# Patient Record
Sex: MALE | Race: WHITE | Employment: OTHER | URBAN - METROPOLITAN AREA
[De-identification: names, ages, dates, MRNs, and addresses within clinical notes are randomized per-mention and may not be internally consistent; named-entity substitution may affect disease eponyms.]

---

## 2019-12-06 ENCOUNTER — HOSPITAL ENCOUNTER (EMERGENCY)
Age: 64
Discharge: HOME | End: 2019-12-06
Attending: EMERGENCY MEDICINE
Payer: MEDICARE

## 2019-12-06 ENCOUNTER — APPOINTMENT (OUTPATIENT)
Dept: CT IMAGING | Age: 64
End: 2019-12-06
Payer: MEDICARE

## 2019-12-06 VITALS
OXYGEN SATURATION: 95 % | HEART RATE: 92 BPM | SYSTOLIC BLOOD PRESSURE: 148 MMHG | DIASTOLIC BLOOD PRESSURE: 90 MMHG | WEIGHT: 180 LBS | TEMPERATURE: 97.9 F | HEIGHT: 67 IN | BODY MASS INDEX: 28.25 KG/M2 | RESPIRATION RATE: 16 BRPM

## 2019-12-06 DIAGNOSIS — E86.0 DEHYDRATION: ICD-10-CM

## 2019-12-06 DIAGNOSIS — M54.9 ACUTE RIGHT-SIDED BACK PAIN, UNSPECIFIED BACK LOCATION: Primary | ICD-10-CM

## 2019-12-06 LAB
ALBUMIN SERPL-MCNC: 4.7 G/DL (ref 3.5–5.2)
ALP BLD-CCNC: 103 U/L (ref 40–129)
ALT SERPL-CCNC: 72 U/L (ref 0–40)
ANION GAP SERPL CALCULATED.3IONS-SCNC: 14 MMOL/L (ref 7–16)
AST SERPL-CCNC: 40 U/L (ref 0–39)
BACTERIA: ABNORMAL /HPF
BASOPHILS ABSOLUTE: 0.04 E9/L (ref 0–0.2)
BASOPHILS RELATIVE PERCENT: 0.5 % (ref 0–2)
BILIRUB SERPL-MCNC: 0.5 MG/DL (ref 0–1.2)
BILIRUBIN URINE: NEGATIVE
BLOOD, URINE: NEGATIVE
BUN BLDV-MCNC: 21 MG/DL (ref 8–23)
CALCIUM SERPL-MCNC: 10 MG/DL (ref 8.6–10.2)
CHLORIDE BLD-SCNC: 101 MMOL/L (ref 98–107)
CLARITY: CLEAR
CO2: 26 MMOL/L (ref 22–29)
COLOR: YELLOW
CREAT SERPL-MCNC: 0.8 MG/DL (ref 0.7–1.2)
EOSINOPHILS ABSOLUTE: 0.19 E9/L (ref 0.05–0.5)
EOSINOPHILS RELATIVE PERCENT: 2.4 % (ref 0–6)
GFR AFRICAN AMERICAN: >60
GFR NON-AFRICAN AMERICAN: >60 ML/MIN/1.73
GLUCOSE BLD-MCNC: 131 MG/DL (ref 74–99)
GLUCOSE URINE: NEGATIVE MG/DL
HCT VFR BLD CALC: 51.5 % (ref 37–54)
HEMOGLOBIN: 18.1 G/DL (ref 12.5–16.5)
IMMATURE GRANULOCYTES #: 0.05 E9/L
IMMATURE GRANULOCYTES %: 0.6 % (ref 0–5)
KETONES, URINE: ABNORMAL MG/DL
LACTIC ACID: 2.3 MMOL/L (ref 0.5–2.2)
LEUKOCYTE ESTERASE, URINE: NEGATIVE
LIPASE: 31 U/L (ref 13–60)
LYMPHOCYTES ABSOLUTE: 2.04 E9/L (ref 1.5–4)
LYMPHOCYTES RELATIVE PERCENT: 26.1 % (ref 20–42)
MCH RBC QN AUTO: 31.2 PG (ref 26–35)
MCHC RBC AUTO-ENTMCNC: 35.1 % (ref 32–34.5)
MCV RBC AUTO: 88.6 FL (ref 80–99.9)
MONOCYTES ABSOLUTE: 0.77 E9/L (ref 0.1–0.95)
MONOCYTES RELATIVE PERCENT: 9.8 % (ref 2–12)
NEUTROPHILS ABSOLUTE: 4.73 E9/L (ref 1.8–7.3)
NEUTROPHILS RELATIVE PERCENT: 60.6 % (ref 43–80)
NITRITE, URINE: NEGATIVE
PDW BLD-RTO: 12.4 FL (ref 11.5–15)
PH UA: 5.5 (ref 5–9)
PLATELET # BLD: 253 E9/L (ref 130–450)
PMV BLD AUTO: 9 FL (ref 7–12)
POTASSIUM SERPL-SCNC: 4 MMOL/L (ref 3.5–5)
PROTEIN UA: NEGATIVE MG/DL
RBC # BLD: 5.81 E12/L (ref 3.8–5.8)
RBC UA: ABNORMAL /HPF (ref 0–2)
SODIUM BLD-SCNC: 141 MMOL/L (ref 132–146)
SPECIFIC GRAVITY UA: 1.02 (ref 1–1.03)
TOTAL PROTEIN: 7.9 G/DL (ref 6.4–8.3)
TROPONIN: <0.01 NG/ML (ref 0–0.03)
UROBILINOGEN, URINE: 0.2 E.U./DL
WBC # BLD: 7.8 E9/L (ref 4.5–11.5)
WBC UA: ABNORMAL /HPF (ref 0–5)

## 2019-12-06 PROCEDURE — 84484 ASSAY OF TROPONIN QUANT: CPT

## 2019-12-06 PROCEDURE — 83605 ASSAY OF LACTIC ACID: CPT

## 2019-12-06 PROCEDURE — 80053 COMPREHEN METABOLIC PANEL: CPT

## 2019-12-06 PROCEDURE — 85025 COMPLETE CBC W/AUTO DIFF WBC: CPT

## 2019-12-06 PROCEDURE — 99283 EMERGENCY DEPT VISIT LOW MDM: CPT

## 2019-12-06 PROCEDURE — 36415 COLL VENOUS BLD VENIPUNCTURE: CPT

## 2019-12-06 PROCEDURE — 81001 URINALYSIS AUTO W/SCOPE: CPT

## 2019-12-06 PROCEDURE — 83690 ASSAY OF LIPASE: CPT

## 2019-12-06 ASSESSMENT — PAIN DESCRIPTION - LOCATION: LOCATION: FLANK

## 2019-12-06 ASSESSMENT — PAIN DESCRIPTION - ORIENTATION: ORIENTATION: RIGHT

## 2019-12-06 ASSESSMENT — PAIN DESCRIPTION - DESCRIPTORS: DESCRIPTORS: DISCOMFORT

## 2019-12-06 ASSESSMENT — PAIN DESCRIPTION - PAIN TYPE: TYPE: ACUTE PAIN

## 2019-12-06 ASSESSMENT — PAIN SCALES - GENERAL: PAINLEVEL_OUTOF10: 3

## 2020-07-13 ENCOUNTER — APPOINTMENT (OUTPATIENT)
Dept: GENERAL RADIOLOGY | Age: 65
End: 2020-07-13
Payer: MEDICARE

## 2020-07-13 ENCOUNTER — HOSPITAL ENCOUNTER (EMERGENCY)
Age: 65
Discharge: HOME | End: 2020-07-13
Payer: MEDICARE

## 2020-07-13 VITALS
WEIGHT: 185 LBS | HEART RATE: 78 BPM | SYSTOLIC BLOOD PRESSURE: 142 MMHG | OXYGEN SATURATION: 96 % | RESPIRATION RATE: 16 BRPM | HEIGHT: 66 IN | TEMPERATURE: 98.1 F | BODY MASS INDEX: 29.73 KG/M2 | DIASTOLIC BLOOD PRESSURE: 78 MMHG

## 2020-07-13 LAB
ALBUMIN SERPL-MCNC: 4.8 G/DL (ref 3.5–5.2)
ALP BLD-CCNC: 88 U/L (ref 40–129)
ALT SERPL-CCNC: 58 U/L (ref 0–40)
ANION GAP SERPL CALCULATED.3IONS-SCNC: 14 MMOL/L (ref 7–16)
AST SERPL-CCNC: 39 U/L (ref 0–39)
BASOPHILS ABSOLUTE: 0.03 E9/L (ref 0–0.2)
BASOPHILS RELATIVE PERCENT: 0.3 % (ref 0–2)
BILIRUB SERPL-MCNC: 0.4 MG/DL (ref 0–1.2)
BILIRUBIN URINE: NEGATIVE
BLOOD, URINE: NEGATIVE
BUN BLDV-MCNC: 20 MG/DL (ref 8–23)
CALCIUM SERPL-MCNC: 9.9 MG/DL (ref 8.6–10.2)
CHLORIDE BLD-SCNC: 103 MMOL/L (ref 98–107)
CLARITY: CLEAR
CO2: 21 MMOL/L (ref 22–29)
COLOR: YELLOW
CREAT SERPL-MCNC: 1.1 MG/DL (ref 0.7–1.2)
EOSINOPHILS ABSOLUTE: 0.01 E9/L (ref 0.05–0.5)
EOSINOPHILS RELATIVE PERCENT: 0.1 % (ref 0–6)
GFR AFRICAN AMERICAN: >60
GFR NON-AFRICAN AMERICAN: >60 ML/MIN/1.73
GLUCOSE BLD-MCNC: 126 MG/DL (ref 74–99)
GLUCOSE URINE: NEGATIVE MG/DL
HCT VFR BLD CALC: 49.4 % (ref 37–54)
HEMOGLOBIN: 17.8 G/DL (ref 12.5–16.5)
IMMATURE GRANULOCYTES #: 0.11 E9/L
IMMATURE GRANULOCYTES %: 0.9 % (ref 0–5)
KETONES, URINE: NEGATIVE MG/DL
LEUKOCYTE ESTERASE, URINE: NEGATIVE
LYMPHOCYTES ABSOLUTE: 1.44 E9/L (ref 1.5–4)
LYMPHOCYTES RELATIVE PERCENT: 12.4 % (ref 20–42)
MCH RBC QN AUTO: 31.8 PG (ref 26–35)
MCHC RBC AUTO-ENTMCNC: 36 % (ref 32–34.5)
MCV RBC AUTO: 88.2 FL (ref 80–99.9)
MONOCYTES ABSOLUTE: 0.64 E9/L (ref 0.1–0.95)
MONOCYTES RELATIVE PERCENT: 5.5 % (ref 2–12)
NEUTROPHILS ABSOLUTE: 9.41 E9/L (ref 1.8–7.3)
NEUTROPHILS RELATIVE PERCENT: 80.8 % (ref 43–80)
NITRITE, URINE: NEGATIVE
PDW BLD-RTO: 12.1 FL (ref 11.5–15)
PH UA: 5.5 (ref 5–9)
PLATELET # BLD: 265 E9/L (ref 130–450)
PMV BLD AUTO: 9.1 FL (ref 7–12)
POTASSIUM REFLEX MAGNESIUM: 4.5 MMOL/L (ref 3.5–5)
PROTEIN UA: NEGATIVE MG/DL
RBC # BLD: 5.6 E12/L (ref 3.8–5.8)
SODIUM BLD-SCNC: 138 MMOL/L (ref 132–146)
SPECIFIC GRAVITY UA: 1.01 (ref 1–1.03)
TOTAL PROTEIN: 7.4 G/DL (ref 6.4–8.3)
TROPONIN: <0.01 NG/ML (ref 0–0.03)
UROBILINOGEN, URINE: 0.2 E.U./DL
WBC # BLD: 11.6 E9/L (ref 4.5–11.5)

## 2020-07-13 PROCEDURE — 99283 EMERGENCY DEPT VISIT LOW MDM: CPT

## 2020-07-13 PROCEDURE — 80053 COMPREHEN METABOLIC PANEL: CPT

## 2020-07-13 PROCEDURE — 84484 ASSAY OF TROPONIN QUANT: CPT

## 2020-07-13 PROCEDURE — 36415 COLL VENOUS BLD VENIPUNCTURE: CPT

## 2020-07-13 PROCEDURE — 71045 X-RAY EXAM CHEST 1 VIEW: CPT

## 2020-07-13 PROCEDURE — 81003 URINALYSIS AUTO W/O SCOPE: CPT

## 2020-07-13 PROCEDURE — 93005 ELECTROCARDIOGRAM TRACING: CPT | Performed by: NURSE PRACTITIONER

## 2020-07-13 PROCEDURE — 2580000003 HC RX 258: Performed by: NURSE PRACTITIONER

## 2020-07-13 PROCEDURE — 85025 COMPLETE CBC W/AUTO DIFF WBC: CPT

## 2020-07-13 PROCEDURE — 93005 ELECTROCARDIOGRAM TRACING: CPT | Performed by: EMERGENCY MEDICINE

## 2020-07-13 RX ORDER — 0.9 % SODIUM CHLORIDE 0.9 %
1000 INTRAVENOUS SOLUTION INTRAVENOUS ONCE
Status: COMPLETED | OUTPATIENT
Start: 2020-07-13 | End: 2020-07-13

## 2020-07-13 RX ORDER — PREDNISONE 10 MG/1
10 TABLET ORAL DAILY
COMMUNITY
End: 2021-05-17

## 2020-07-13 RX ADMIN — SODIUM CHLORIDE 1000 ML: 9 INJECTION, SOLUTION INTRAVENOUS at 17:02

## 2020-07-13 NOTE — ED NOTES
Pt reports he is having some dizziness while lying in the cart. He denies chest pain at this time but has nausea.      [facility], [address] [place] [address]  07/13/20 8259

## 2020-07-13 NOTE — ED NOTES
Sadia BLANKENSHIP reports pt c/o dizziness. EKG in progress.       [facility], [address] [place] [address]  07/13/20 4429

## 2020-07-13 NOTE — ED PROVIDER NOTES
Saturation Interpretation: Normal.   ED Triage Vitals [07/13/20 1535]   BP Temp Temp Source Pulse Resp SpO2 Height Weight   (!) 158/106 98.1 °F (36.7 °C) Oral 102 16 96 % 5' 6\" (1.676 m) 185 lb (83.9 kg)       Physical Exam  · Constitutional/General: Alert and oriented x3, well appearing, non toxic. · HEENT:  NC/NT. PERRLA,  Airway patent. · Neck: Supple, full ROM, non tender to palpation in the midline, no stridor, no crepitus, no meningeal signs  · Respiratory: Lungs clear to auscultation bilaterally, no wheezes, rales, or rhonchi. Not in respiratory distress  · CV:  Regular rate. Regular rhythm. No murmurs, gallops, or rubs. 2+ distal pulses  · Chest: No chest wall tenderness  · GI:  Abdomen Soft, Non tender, Non distended. +BS. No rebound, guarding, or rigidity. No pulsatile masses. · Musculoskeletal: Moves all extremities x 4. Warm and well perfused, no clubbing, cyanosis, or edema. Capillary refill <3 seconds  · Integument: skin warm and dry. Diffuse macular, non-erythematous rash noted to back. No erythema. No drainage.    · Lymphatic: no lymphadenopathy noted  · Neurologic: GCS 15, no focal deficits, symmetric strength 5/5 in the upper and lower extremities bilaterally  · Psychiatric: Normal Affect      Lab / Imaging Results   (All laboratory and radiology results have been personally reviewed by myself)  Labs:  Results for orders placed or performed during the hospital encounter of 07/13/20   URINALYSIS   Result Value Ref Range    Color, UA Yellow Straw/Yellow    Clarity, UA Clear Clear    Glucose, Ur Negative Negative mg/dL    Bilirubin Urine Negative Negative    Ketones, Urine Negative Negative mg/dL    Specific Gravity, UA 1.015 1.005 - 1.030    Blood, Urine Negative Negative    pH, UA 5.5 5.0 - 9.0    Protein, UA Negative Negative mg/dL    Urobilinogen, Urine 0.2 <2.0 E.U./dL    Nitrite, Urine Negative Negative    Leukocyte Esterase, Urine Negative Negative   CBC Auto Differential   Result Value Ref Range    WBC 11.6 (H) 4.5 - 11.5 E9/L    RBC 5.60 3.80 - 5.80 E12/L    Hemoglobin 17.8 (H) 12.5 - 16.5 g/dL    Hematocrit 49.4 37.0 - 54.0 %    MCV 88.2 80.0 - 99.9 fL    MCH 31.8 26.0 - 35.0 pg    MCHC 36.0 (H) 32.0 - 34.5 %    RDW 12.1 11.5 - 15.0 fL    Platelets 744 291 - 062 E9/L    MPV 9.1 7.0 - 12.0 fL    Neutrophils % 80.8 (H) 43.0 - 80.0 %    Immature Granulocytes % 0.9 0.0 - 5.0 %    Lymphocytes % 12.4 (L) 20.0 - 42.0 %    Monocytes % 5.5 2.0 - 12.0 %    Eosinophils % 0.1 0.0 - 6.0 %    Basophils % 0.3 0.0 - 2.0 %    Neutrophils Absolute 9.41 (H) 1.80 - 7.30 E9/L    Immature Granulocytes # 0.11 E9/L    Lymphocytes Absolute 1.44 (L) 1.50 - 4.00 E9/L    Monocytes Absolute 0.64 0.10 - 0.95 E9/L    Eosinophils Absolute 0.01 (L) 0.05 - 0.50 E9/L    Basophils Absolute 0.03 0.00 - 0.20 E9/L   Comprehensive Metabolic Panel w/ Reflex to MG   Result Value Ref Range    Sodium 138 132 - 146 mmol/L    Potassium reflex Magnesium 4.5 3.5 - 5.0 mmol/L    Chloride 103 98 - 107 mmol/L    CO2 21 (L) 22 - 29 mmol/L    Anion Gap 14 7 - 16 mmol/L    Glucose 126 (H) 74 - 99 mg/dL    BUN 20 8 - 23 mg/dL    CREATININE 1.1 0.7 - 1.2 mg/dL    GFR Non-African American >60 >=60 mL/min/1.73    GFR African American >60     Calcium 9.9 8.6 - 10.2 mg/dL    Total Protein 7.4 6.4 - 8.3 g/dL    Alb 4.8 3.5 - 5.2 g/dL    Total Bilirubin 0.4 0.0 - 1.2 mg/dL    Alkaline Phosphatase 88 40 - 129 U/L    ALT 58 (H) 0 - 40 U/L    AST 39 0 - 39 U/L   Troponin   Result Value Ref Range    Troponin <0.01 0.00 - 0.03 ng/mL   EKG 12 Lead   Result Value Ref Range    Ventricular Rate 91 BPM    Atrial Rate 91 BPM    P-R Interval 150 ms    QRS Duration 100 ms    Q-T Interval 370 ms    QTc Calculation (Bazett) 455 ms    P Axis 34 degrees    R Axis -45 degrees    T Axis 29 degrees   EKG 12 Lead   Result Value Ref Range    Ventricular Rate 94 BPM    Atrial Rate 66 BPM    P-R Interval 138 ms    QRS Duration 102 ms    Q-T Interval 366 ms    QTc Calculation with Prednisone and MSM. His symptoms continue to improve quickly throughout his stay. CBC and CMP unremarkable. Troponin < 0.01. EKG sinus rhythm no acute finding and stable when compared to previous. No chest pain or shortness of breath. Chest x-ray negative. He was given IV fluids with significant improvement of his symptoms. He requested to be discharged. Plan is to stop MSM supplement. He agrees with the plan and states that he will never take that medication again. He remained hemodynamically stable, non-toxic, and appropriate for outpatient management. He was instructed to have close follow up with his PCP, and advised to return for any new, changing, worsening symptoms or concerns. At this time the patient is without objective evidence of an acute process requiring hospitalization or inpatient management. They have remained hemodynamically stable throughout their entire ED visit and are stable for discharge with outpatient follow-up. The plan has been discussed in detail and they are aware of the specific conditions for emergent return, as well as the importance of follow-up. Counseling:   I have spoken with the patient and discussed todays results, in addition to providing specific details for the plan of care and counseling regarding the diagnosis and prognosis and are agreeable with the plan. Assessment      1. Medication intolerance    2. Dermatitis      This patient's ED course included: a personal history and physicial examination, re-evaluation prior to disposition, cardiac monitoring and continuous pulse oximetry  This patient has remained hemodynamically stable and improved during their ED course. Plan   Discharge to home. Patient condition is good. New Medications     Discharge Medication List as of 7/13/2020  6:46 PM        Electronically signed by JONATHAN Stuart CNP   DD: 7/13/20  **This report was transcribed using voice recognition software.  Every effort was made

## 2020-07-14 LAB
EKG ATRIAL RATE: 66 BPM
EKG ATRIAL RATE: 91 BPM
EKG P AXIS: 34 DEGREES
EKG P AXIS: 38 DEGREES
EKG P-R INTERVAL: 138 MS
EKG P-R INTERVAL: 150 MS
EKG Q-T INTERVAL: 366 MS
EKG Q-T INTERVAL: 370 MS
EKG QRS DURATION: 100 MS
EKG QRS DURATION: 102 MS
EKG QTC CALCULATION (BAZETT): 455 MS
EKG QTC CALCULATION (BAZETT): 457 MS
EKG R AXIS: -28 DEGREES
EKG R AXIS: -45 DEGREES
EKG T AXIS: 11 DEGREES
EKG T AXIS: 29 DEGREES
EKG VENTRICULAR RATE: 91 BPM
EKG VENTRICULAR RATE: 94 BPM

## 2020-07-29 ENCOUNTER — HOSPITAL ENCOUNTER (EMERGENCY)
Age: 65
Discharge: LEFT BEFORE DISCHARGE | End: 2020-07-29
Payer: MEDICARE

## 2020-07-29 VITALS — TEMPERATURE: 98.4 F | SYSTOLIC BLOOD PRESSURE: 146 MMHG | DIASTOLIC BLOOD PRESSURE: 94 MMHG

## 2020-07-29 NOTE — ED NOTES
Called-  No answer-  Was told by [address] [facility] that the pt left after getting his BP checked in the lobby     Myla Feng RN  07/29/20 1224

## 2020-11-27 ENCOUNTER — OFFICE VISIT (OUTPATIENT)
Dept: PRIMARY CARE CLINIC | Age: 65
End: 2020-11-27
Payer: MEDICARE

## 2020-11-27 VITALS
WEIGHT: 185 LBS | HEART RATE: 83 BPM | DIASTOLIC BLOOD PRESSURE: 80 MMHG | OXYGEN SATURATION: 96 % | HEIGHT: 67 IN | SYSTOLIC BLOOD PRESSURE: 140 MMHG | BODY MASS INDEX: 29.03 KG/M2 | TEMPERATURE: 97.8 F

## 2020-11-27 DIAGNOSIS — Z20.822 EXPOSURE TO COVID-19 VIRUS: ICD-10-CM

## 2020-11-27 LAB
Lab: NORMAL
QC PASS/FAIL: NORMAL
SARS-COV-2, POC: NORMAL

## 2020-11-27 PROCEDURE — 87426 SARSCOV CORONAVIRUS AG IA: CPT | Performed by: NURSE PRACTITIONER

## 2020-11-27 PROCEDURE — 99213 OFFICE O/P EST LOW 20 MIN: CPT | Performed by: NURSE PRACTITIONER

## 2020-11-27 NOTE — PROGRESS NOTES
Chief Complaint   Cough (x 1 day)      History of Present Illness   Source of history provided by:  patient. Kirstin Valencia is a 76 y.o. old male who presents to the flu clinic with complaints of cough and low-grade fever. Reports temperature of 100.5 °F last night. He did take Tylenol for symptoms with relief of the fever. He does report he just flew back from [place] yesterday. States that his symptoms have stayed the same since onset. Denies any history of asthma diabetes or tobacco use. Denies any current fever, chills, nausea, vomiting, diarrhea, chest pain, shortness of breath or acute loss of taste or smell. ROS   Pertinent positives and negatives are stated within HPI, all other systems reviewed and are negative. Past Medical History:  has a past medical history of Hyperlipidemia. Past Surgical History:  has a past surgical history that includes joint replacement; Achilles tendon surgery; and knee surgery. Social History:  reports that he has never smoked. He has never used smokeless tobacco. He reports current alcohol use. He reports that he does not use drugs. Family History: family history is not on file. Allergies: Morphine    Physical Exam   Vital Signs:  BP (!) 140/80   Pulse 83   Temp 97.8 °F (36.6 °C)   Ht 5' 6.5\" (1.689 m)   Wt 185 lb (83.9 kg)   SpO2 96%   BMI 29.41 kg/m²    Oxygen Saturation Interpretation: Normal.    Constitutional:  Alert, development consistent with age. NAD. Head:  NC/NT. Airway patent. Ears: TMs clear bilaterally. Canals without exudate or swelling bilaterally. Mouth: Posterior pharynx with mild erythema and clear postnasal drip. No tonsillar hypertrophy or exudate. Neck:  Normal ROM. Supple. No anterior cervical adenopathy noted. Lungs: CTAB without wheezes, rales, or rhonchi. CV:  Regular rate and rhythm, normal heart sounds, without pathological murmurs, ectopy, gallops, or rubs. Skin:  Normal turgor.   Warm, dry, without visible rash. Lymphatic: No lymphangitis or adenopathy noted. Neurological:  Oriented. Motor functions intact. Lab / Imaging Results   (All laboratory and radiology results have been personally reviewed by myself)  Labs:  Results for orders placed or performed in visit on 11/27/20   POCT COVID-19, Antigen   Result Value Ref Range    SARS-COV-2, POC Not-Detected Not Detected    Lot Number [id number]     QC Pass/Fail pass        Imaging: All Radiology results interpreted by Radiologist unless otherwise noted. No results found. Medical Decision Making   Pt non-toxic, in no apparent distress and stable at time of discharge. Assessment/Plan   Gene was seen today for cough. Diagnoses and all orders for this visit:    Cough        - OTC medications for symptom relief    Exposure to COVID-19 virus  -     POCT COVID-19, Antigen  -     COVID-19 Ambulatory; Future    Rapid COVID-19 negative in office, patient advised results. Confirmatory PCR COVID-19 swab obtained and pending, will call with results once available. Advised self-quarantine at home in the interim. Increase fluids and rest. Symptomatic relief discussed including Tylenol prn pain/fever. Schedule f/u with PCP in 7-10 days if symptoms persist. ED sooner if symptoms worsen or change. ED immediately with high or refractory fever, progressive SOB, dyspnea, CP, calf pain/swelling, shaking chills, vomiting, abdominal pain, lethargy, flank pain, or decreased urinary output. Pt verbalizes understanding and is in agreement with plan of care. All questions answered. Cem Walls, APRN - CNP    This visit was provided as a focused evaluation during the COVID -19 pandemic/national emergency. A comprehensive review of all previous patient history and testing was not conducted. Pertinent findings were elicited during the visit. *NOTE: This report was transcribed using voice recognition software.  Every effort was made to ensure accuracy; however, inadvertent computerized transcription errors may be present.

## 2020-11-29 LAB
SARS-COV-2: DETECTED
SOURCE: ABNORMAL

## 2021-02-27 ENCOUNTER — IMMUNIZATION (OUTPATIENT)
Dept: PRIMARY CARE CLINIC | Age: 66
End: 2021-02-27
Payer: MEDICARE

## 2021-02-27 PROCEDURE — 91300 COVID-19, PFIZER VACCINE 30MCG/0.3ML DOSE: CPT | Performed by: INTERNAL MEDICINE

## 2021-02-27 PROCEDURE — 0001A COVID-19, PFIZER VACCINE 30MCG/0.3ML DOSE: CPT | Performed by: INTERNAL MEDICINE

## 2021-03-24 ENCOUNTER — IMMUNIZATION (OUTPATIENT)
Dept: PRIMARY CARE CLINIC | Age: 66
End: 2021-03-24
Payer: MEDICARE

## 2021-03-24 PROCEDURE — 0002A COVID-19, PFIZER VACCINE 30MCG/0.3ML DOSE: CPT | Performed by: NURSE PRACTITIONER

## 2021-03-24 PROCEDURE — 91300 COVID-19, PFIZER VACCINE 30MCG/0.3ML DOSE: CPT | Performed by: NURSE PRACTITIONER

## 2021-05-17 ENCOUNTER — APPOINTMENT (OUTPATIENT)
Dept: ULTRASOUND IMAGING | Age: 66
End: 2021-05-17
Payer: MEDICARE

## 2021-05-17 ENCOUNTER — HOSPITAL ENCOUNTER (EMERGENCY)
Age: 66
Discharge: HOME | End: 2021-05-17
Attending: EMERGENCY MEDICINE
Payer: MEDICARE

## 2021-05-17 VITALS
RESPIRATION RATE: 14 BRPM | TEMPERATURE: 97.6 F | HEIGHT: 66 IN | BODY MASS INDEX: 28.77 KG/M2 | OXYGEN SATURATION: 96 % | WEIGHT: 179 LBS | HEART RATE: 90 BPM | DIASTOLIC BLOOD PRESSURE: 82 MMHG | SYSTOLIC BLOOD PRESSURE: 154 MMHG

## 2021-05-17 DIAGNOSIS — M79.671 PAIN OF RIGHT HEEL: Primary | ICD-10-CM

## 2021-05-17 DIAGNOSIS — R10.31 RIGHT GROIN PAIN: ICD-10-CM

## 2021-05-17 LAB
EKG ATRIAL RATE: 81 BPM
EKG P AXIS: 47 DEGREES
EKG P-R INTERVAL: 154 MS
EKG Q-T INTERVAL: 370 MS
EKG QRS DURATION: 106 MS
EKG QTC CALCULATION (BAZETT): 429 MS
EKG R AXIS: -31 DEGREES
EKG T AXIS: 48 DEGREES
EKG VENTRICULAR RATE: 81 BPM

## 2021-05-17 PROCEDURE — 93971 EXTREMITY STUDY: CPT

## 2021-05-17 PROCEDURE — 99283 EMERGENCY DEPT VISIT LOW MDM: CPT

## 2021-05-17 PROCEDURE — 93010 ELECTROCARDIOGRAM REPORT: CPT | Performed by: INTERNAL MEDICINE

## 2021-05-17 PROCEDURE — 93005 ELECTROCARDIOGRAM TRACING: CPT | Performed by: EMERGENCY MEDICINE

## 2021-05-17 RX ORDER — HYDROCODONE BITARTRATE AND ACETAMINOPHEN 5; 325 MG/1; MG/1
1 TABLET ORAL EVERY 8 HOURS PRN
Qty: 6 TABLET | Refills: 0 | Status: SHIPPED | OUTPATIENT
Start: 2021-05-17 | End: 2021-05-19

## 2021-05-17 ASSESSMENT — PAIN DESCRIPTION - LOCATION: LOCATION: FOOT

## 2021-05-17 ASSESSMENT — PAIN DESCRIPTION - PAIN TYPE: TYPE: ACUTE PAIN

## 2021-05-17 ASSESSMENT — PAIN DESCRIPTION - ORIENTATION: ORIENTATION: RIGHT

## 2021-05-17 ASSESSMENT — PAIN SCALES - GENERAL: PAINLEVEL_OUTOF10: 6

## 2021-05-17 NOTE — ED PROVIDER NOTES
HPI:  5/17/21, Time: 2:41 PM EDT         Cesar Ospina is a 78 y.o. male presenting to the ED for right heel pain beginning a few days ago. Symptoms have been mild to moderate in severity and intermittent. Symptoms are worsened with palpation and bearing weight and temporaryily relieved by NSAIDs. He states he has a history of prior Achilles tendon repairs 4 years ago. He denies any specific trauma, but states that he was recently in [place] [address] and wearing sandals which may have exacerbated his pain. Also states that he is having pain to his right groin that began after swinging a golf club. No urinary complaints, nausea, vomiting, diarrhea, or abdominal pain. States that the pain in his groin is worse with certain movements of his leg and palpation and alleviated by NSAIDs. He states that earlier today he was taking an Aleve when he felt lightheadedness shortly after. States he had not eaten anything all day. No syncope, chest pain, shortness of breath, or hemoptysis. States that he flew to [place] recently. No history of DVT/PE. Currently has no lightheadedness. States he attributed his symptoms to being dehydrated. States he drinks Diet Coke, and he now feels back to baseline. No strokelike symptoms. Review of Systems:   Pertinent positives and negatives are stated within HPI, all other systems reviewed and are negative.          --------------------------------------------- PAST HISTORY ---------------------------------------------  Past Medical History:  has a past medical history of Hyperlipidemia. Past Surgical History:  has a past surgical history that includes joint replacement; Achilles tendon surgery; and knee surgery. Social History:  reports that he has never smoked. He has never used smokeless tobacco. He reports current alcohol use. He reports that he does not use drugs. Family History: family history is not on file.      The patients home medications have been joint, DP and PT pulses intact, warm and well-perfused, no wounds. Skin: warm and dry without rash  Neurologic: GCS 15, no focal motor or sensory deficits   Psych: Normal Affect. Behavior normal.      ------------------------------ ED COURSE/MEDICAL DECISION MAKING----------------------  Medications - No data to display    Medical Decision Making/ED COURSE:   Patient is a 67-year-old male presenting with right heel pain and right groin pain. Also states he had episode of dizziness earlier this morning after taking Aleve. States that he drank some Diet Coke with resolution of his symptoms. He currently has no dizziness. He is hemodynamically stable and afebrile in the ED. He is good oxygen saturations. He is well-appearing. On exam, he has tenderness over his right Achilles without obvious deformity. Reports a history of prior Achilles tendon surgery. He does have good function with no evidence of rupture on my exam.  He also has tenderness to his right groin without obvious hernia. Does state that he has been golfing recently and believes he injured his groin and his achilles while golfing. Right lower extremity Doppler shows no evidence of PE. Considered pulmonary embolus though seems very unlikely. He has no chest pain, shortness of breath, syncope, or hemoptysis. Low suspicion at this time. Discussed this with the patient who agrees and would like to defer any further work-up. No acute EKG changes. Patient's pain likely musculoskeletal in etiology. Will be treated symptomatically and written for prescription for Norco.  Also be given orthopedic referral.  Also advised follow-up with his PCP. Supportive care measures and strict ED return precautions discussed. Patient remained hemodynamically stable throughout ED course. ED Course as of May 17 1614   Mon May 17, 2021   1458 EKG: This EKG is signed and interpreted by me.     Rate: 81  Rhythm: Sinus  Interpretation: Normal sinus rhythm, normal AL interval, normal QRS, normal QT interval, no acute ST or T wave changes, left axis deviation  Comparison: stable as compared to patient's most recent EKG        [JA]      ED Course User Index  [JA] Antonina Costello MD         Counseling: The emergency provider has spoken with the patient and discussed todays results, in addition to providing specific details for the plan of care and counseling regarding the diagnosis and prognosis. Questions are answered at this time and they are agreeable with the plan.      --------------------------------- IMPRESSION AND DISPOSITION ---------------------------------    IMPRESSION  1. Pain of right heel    2. Right groin pain        DISPOSITION  Disposition: Discharge to home  Patient condition is stable      NOTE: This report was transcribed using voice recognition software.  Every effort was made to ensure accuracy; however, inadvertent computerized transcription errors may be present    I, Antonina Costello MD, am the primary provider of this record       Antonina Costello MD  05/17/21 [phone]

## 2023-03-08 ENCOUNTER — HOSPITAL ENCOUNTER (EMERGENCY)
Age: 68
Discharge: HOME | End: 2023-03-08
Payer: MEDICARE

## 2023-03-08 ENCOUNTER — APPOINTMENT (OUTPATIENT)
Dept: GENERAL RADIOLOGY | Age: 68
End: 2023-03-08
Payer: MEDICARE

## 2023-03-08 VITALS
HEART RATE: 88 BPM | WEIGHT: 185 LBS | HEIGHT: 66 IN | OXYGEN SATURATION: 99 % | RESPIRATION RATE: 16 BRPM | DIASTOLIC BLOOD PRESSURE: 96 MMHG | TEMPERATURE: 97.8 F | BODY MASS INDEX: 29.73 KG/M2 | SYSTOLIC BLOOD PRESSURE: 148 MMHG

## 2023-03-08 DIAGNOSIS — M79.641 RIGHT HAND PAIN: Primary | ICD-10-CM

## 2023-03-08 DIAGNOSIS — S61.431A PUNCTURE WOUND OF RIGHT HAND WITHOUT FOREIGN BODY, INITIAL ENCOUNTER: ICD-10-CM

## 2023-03-08 PROCEDURE — 73130 X-RAY EXAM OF HAND: CPT

## 2023-03-08 PROCEDURE — 6360000002 HC RX W HCPCS: Performed by: PHYSICIAN ASSISTANT

## 2023-03-08 PROCEDURE — 99284 EMERGENCY DEPT VISIT MOD MDM: CPT

## 2023-03-08 PROCEDURE — 90471 IMMUNIZATION ADMIN: CPT | Performed by: PHYSICIAN ASSISTANT

## 2023-03-08 PROCEDURE — 90714 TD VACC NO PRESV 7 YRS+ IM: CPT | Performed by: PHYSICIAN ASSISTANT

## 2023-03-08 RX ADMIN — CLOSTRIDIUM TETANI TOXOID ANTIGEN (FORMALDEHYDE INACTIVATED) AND CORYNEBACTERIUM DIPHTHERIAE TOXOID ANTIGEN (FORMALDEHYDE INACTIVATED) 0.5 ML: 5; 2 INJECTION, SUSPENSION INTRAMUSCULAR at 11:34

## 2023-03-08 ASSESSMENT — LIFESTYLE VARIABLES
HOW OFTEN DO YOU HAVE A DRINK CONTAINING ALCOHOL: NEVER
HOW MANY STANDARD DRINKS CONTAINING ALCOHOL DO YOU HAVE ON A TYPICAL DAY: PATIENT DOES NOT DRINK

## 2023-03-08 NOTE — ED PROVIDER NOTES
Independent [facility] Visit. Department of Emergency Medicine   ED  Provider Note  Admit Date/RoomTime: 3/8/2023 11:09 AM  ED Room: 01/01        HPI:  3/8/23,   Time: 11:26 AM RAMONA Keenan is a 75 y.o. male presenting to the ED for injury and pain to right hand, beginning 1 day ago. The complaint has been persistent, mild in severity, and worsened by nothing. The patient states that yesterday he finished a round of golf and went to get his keys out of his golf bag. He states that he felt something sharp and stinging in his right palm. He has a tiny red wound over the palmar surface of his hand at the base of the index finger. There is no drainage or discharge. The patient states that he never sought any sort of insect or spider. He never looked into the golf bag however. He reports an intermittent stinging sensation at the site. There is no significant swelling. No redness or streaking reported. The patient denies any swelling in his lips mouth or throat. No shortness of breath or chest pain reported. Last tetanus is uncertain.         ROS:     Constitutional: Negative for fever and chills  HENT: Negative for ear pain, sore throat and sinus pressure  Eyes: Negative for pain, discharge and redness  Respiratory:  Negative for shortness of breath, cough and wheezing  Cardiovascular: Negative for CP, edema or palpitations  Gastrointestinal: Negative for nausea, vomiting, diarrhea and abdominal distention  Genitourinary: Negative for dysuria and frequency  Musculoskeletal: See HPI  Skin: See HPI  Neurological: Negative for weakness and headaches  Hematological: Negative for adenopathy    All other systems reviewed and are negative      -------------------------------- PAST HISTORY ----------------------------------  Past Medical History:  has a past medical history of Hyperlipidemia.    Past Surgical History:  has a past surgical history that includes joint replacement; Achilles tendon surgery; and knee surgery.    Social History:  reports that he has never smoked. He has never used smokeless tobacco. He reports current alcohol use. He reports that he does not use drugs.    Family History: family history is not on file.     The patient’s home medications have been reviewed.    Allergies: Morphine    --------------------------------- RESULTS ------------------------------------------  All laboratory and radiology results have been personally reviewed by myself   LABS:  No results found for this visit on 03/08/23.    RADIOLOGY:  Interpreted by Radiologist.  XR HAND RIGHT (MIN 3 VIEWS)   Final Result   No acute osseous abnormality.      No radiopaque foreign bodies.             ----------------- NURSING NOTES AND VITALS REVIEWED ---------------   The nursing notes within the ED encounter and vital signs as below have been reviewed.   BP (!) 148/96   Pulse 88   Temp 97.8 °F (36.6 °C) (Oral)   Resp 16   Ht 5' 6\" (1.676 m)   Wt 185 lb (83.9 kg)   SpO2 99%   BMI 29.86 kg/m²   Oxygen Saturation Interpretation: Normal      --------------------------------PHYSICAL EXAM------------------------------------      Constitutional/General: Alert and oriented x3, well appearing, non toxic in NAD  Head: NC/AT  Eyes: PERRL, EOMI  Mouth: Oropharynx clear, handling secretions, no trismus  Neck: Supple, full ROM, no meningeal signs  Pulmonary: Lungs clear to auscultation bilaterally, no wheezes, rales, or rhonchi. Not in respiratory distress  Cardiovascular:  Regular rate and rhythm, no murmurs, gallops, or rubs. 2+ distal pulses  Extremities: Moves all extremities x 4. Pt has tiny wound over palmar surface right hand at base index finger.   No drainage or pus seen.   No significant surrounding redness.    ROM intact.   Normal pulses and sensation.   Warm and well perfused  Skin: warm and dry  without rash  Neurologic: GCS 15,  Intact. No focal deficits  Psych: Normal Affect      ------------------------ ED COURSE/MEDICAL DECISION MAKING----------------------  Medications   tetanus & diphtheria toxoids (adult) 5-2 LFU injection 0.5 mL (0.5 mLs IntraMUSCular Given 3/8/23 9682)         Medical Decision Making:    I r further treatment and antibiotics. The patient is aware and agreeable to this plan eally cannot say for certain if this is a wound from some sort of trauma or bite. I did suggest that the patient clear out his golf bag and take a look and see if he finds any remnants of any bug or insect. We did update his tetanus here and imaging showed no acute foreign body. He was otherwise reassured and we did discuss wound care. Certainly if he develops any redness, pus, streaking or fever he is advised to return for       Counseling: The emergency provider has spoken with the patient and discussed todays results, in addition to providing specific details for the plan of care and counseling regarding the diagnosis and prognosis. Questions are answered at this time and they are agreeable with the plan.      ------------------------ IMPRESSION AND DISPOSITION -------------------------------    IMPRESSION  1. Right hand pain    2.  Puncture wound of right hand without foreign body, initial encounter        DISPOSITION  Disposition: Discharge to home  Patient condition is stable                    Ronal Veras PA-C  03/08/23 9441

## 2024-05-26 ENCOUNTER — APPOINTMENT (OUTPATIENT)
Dept: GENERAL RADIOLOGY | Age: 69
End: 2024-05-26
Payer: MEDICARE

## 2024-05-26 ENCOUNTER — HOSPITAL ENCOUNTER (EMERGENCY)
Age: 69
Discharge: HOME | End: 2024-05-26
Payer: MEDICARE

## 2024-05-26 VITALS
HEIGHT: 66 IN | TEMPERATURE: 97.5 F | DIASTOLIC BLOOD PRESSURE: 84 MMHG | RESPIRATION RATE: 16 BRPM | WEIGHT: 185 LBS | OXYGEN SATURATION: 95 % | BODY MASS INDEX: 29.73 KG/M2 | HEART RATE: 79 BPM | SYSTOLIC BLOOD PRESSURE: 141 MMHG

## 2024-05-26 DIAGNOSIS — R05.1 ACUTE COUGH: Primary | ICD-10-CM

## 2024-05-26 LAB
ANION GAP SERPL CALCULATED.3IONS-SCNC: 14 MMOL/L (ref 7–16)
BASOPHILS # BLD: 0.03 K/UL (ref 0–0.2)
BASOPHILS NFR BLD: 0 % (ref 0–2)
BUN SERPL-MCNC: 18 MG/DL (ref 6–23)
CALCIUM SERPL-MCNC: 9.3 MG/DL (ref 8.6–10.2)
CHLORIDE SERPL-SCNC: 103 MMOL/L (ref 98–107)
CO2 SERPL-SCNC: 23 MMOL/L (ref 22–29)
CREAT SERPL-MCNC: 1 MG/DL (ref 0.7–1.2)
D-DIMER QUANTITATIVE: <200 NG/ML DDU (ref 0–230)
EOSINOPHIL # BLD: 0.2 K/UL (ref 0.05–0.5)
EOSINOPHILS RELATIVE PERCENT: 3 % (ref 0–6)
ERYTHROCYTE [DISTWIDTH] IN BLOOD BY AUTOMATED COUNT: 12.4 % (ref 11.5–15)
FLUAV RNA RESP QL NAA+PROBE: NOT DETECTED
FLUBV RNA RESP QL NAA+PROBE: NOT DETECTED
GFR, ESTIMATED: 82 ML/MIN/1.73M2
GLUCOSE SERPL-MCNC: 127 MG/DL (ref 74–99)
HCT VFR BLD AUTO: 47.6 % (ref 37–54)
HGB BLD-MCNC: 16.7 G/DL (ref 12.5–16.5)
IMM GRANULOCYTES # BLD AUTO: 0.04 K/UL (ref 0–0.58)
IMM GRANULOCYTES NFR BLD: 1 % (ref 0–5)
LYMPHOCYTES NFR BLD: 2.28 K/UL (ref 1.5–4)
LYMPHOCYTES RELATIVE PERCENT: 28 % (ref 20–42)
MCH RBC QN AUTO: 30.4 PG (ref 26–35)
MCHC RBC AUTO-ENTMCNC: 35.1 G/DL (ref 32–34.5)
MCV RBC AUTO: 86.5 FL (ref 80–99.9)
MONOCYTES NFR BLD: 0.62 K/UL (ref 0.1–0.95)
MONOCYTES NFR BLD: 8 % (ref 2–12)
NEUTROPHILS NFR BLD: 61 % (ref 43–80)
NEUTS SEG NFR BLD: 4.91 K/UL (ref 1.8–7.3)
PLATELET # BLD AUTO: 250 K/UL (ref 130–450)
PMV BLD AUTO: 9 FL (ref 7–12)
POTASSIUM SERPL-SCNC: 3.9 MMOL/L (ref 3.5–5)
RBC # BLD AUTO: 5.5 M/UL (ref 3.8–5.8)
SARS-COV-2 RNA RESP QL NAA+PROBE: NOT DETECTED
SODIUM SERPL-SCNC: 140 MMOL/L (ref 132–146)
SOURCE: NORMAL
SPECIMEN DESCRIPTION: NORMAL
WBC OTHER # BLD: 8.1 K/UL (ref 4.5–11.5)

## 2024-05-26 PROCEDURE — 85379 FIBRIN DEGRADATION QUANT: CPT

## 2024-05-26 PROCEDURE — 71046 X-RAY EXAM CHEST 2 VIEWS: CPT

## 2024-05-26 PROCEDURE — 99284 EMERGENCY DEPT VISIT MOD MDM: CPT

## 2024-05-26 PROCEDURE — 87636 SARSCOV2 & INF A&B AMP PRB: CPT

## 2024-05-26 PROCEDURE — 80048 BASIC METABOLIC PNL TOTAL CA: CPT

## 2024-05-26 PROCEDURE — 85025 COMPLETE CBC W/AUTO DIFF WBC: CPT

## 2024-05-26 RX ORDER — BENZONATATE 100 MG/1
100 CAPSULE ORAL 3 TIMES DAILY PRN
Qty: 30 CAPSULE | Refills: 0 | Status: SHIPPED | OUTPATIENT
Start: 2024-05-26 | End: 2024-06-05

## 2024-05-26 ASSESSMENT — PAIN SCALES - GENERAL: PAINLEVEL_OUTOF10: 6

## 2024-05-26 ASSESSMENT — PAIN DESCRIPTION - ONSET: ONSET: ON-GOING

## 2024-05-26 ASSESSMENT — PAIN - FUNCTIONAL ASSESSMENT
PAIN_FUNCTIONAL_ASSESSMENT: 0-10
PAIN_FUNCTIONAL_ASSESSMENT: NONE - DENIES PAIN

## 2024-05-26 ASSESSMENT — PAIN DESCRIPTION - DESCRIPTORS: DESCRIPTORS: ACHING;DISCOMFORT;SORE;TENDER

## 2024-05-26 ASSESSMENT — LIFESTYLE VARIABLES
HOW MANY STANDARD DRINKS CONTAINING ALCOHOL DO YOU HAVE ON A TYPICAL DAY: PATIENT DOES NOT DRINK
HOW OFTEN DO YOU HAVE A DRINK CONTAINING ALCOHOL: NEVER

## 2024-05-26 ASSESSMENT — PAIN DESCRIPTION - PAIN TYPE: TYPE: ACUTE PAIN

## 2024-05-26 ASSESSMENT — PAIN DESCRIPTION - FREQUENCY: FREQUENCY: CONTINUOUS

## 2024-05-26 ASSESSMENT — PAIN DESCRIPTION - LOCATION: LOCATION: BACK

## 2024-05-26 NOTE — ED PROVIDER NOTES
plan.      --------------------------------- IMPRESSION AND DISPOSITION ---------------------------------    IMPRESSION  1. Acute cough        DISPOSITION  Disposition: Discharge to home  Patient condition is good                 Tamy Raman PA-C  05/26/24 2107

## 2025-05-03 ENCOUNTER — APPOINTMENT (OUTPATIENT)
Dept: GENERAL RADIOLOGY | Age: 70
End: 2025-05-03
Payer: MEDICARE

## 2025-05-03 ENCOUNTER — HOSPITAL ENCOUNTER (EMERGENCY)
Age: 70
Discharge: HOME | End: 2025-05-03
Attending: STUDENT IN AN ORGANIZED HEALTH CARE EDUCATION/TRAINING PROGRAM
Payer: MEDICARE

## 2025-05-03 VITALS
RESPIRATION RATE: 16 BRPM | SYSTOLIC BLOOD PRESSURE: 169 MMHG | HEART RATE: 76 BPM | OXYGEN SATURATION: 98 % | DIASTOLIC BLOOD PRESSURE: 88 MMHG | TEMPERATURE: 97.8 F

## 2025-05-03 DIAGNOSIS — M25.512 LEFT SHOULDER PAIN, UNSPECIFIED CHRONICITY: Primary | ICD-10-CM

## 2025-05-03 DIAGNOSIS — R07.9 CHEST PAIN, UNSPECIFIED TYPE: ICD-10-CM

## 2025-05-03 LAB
ALBUMIN SERPL-MCNC: 4.4 G/DL (ref 3.5–5.2)
ALP SERPL-CCNC: 81 U/L (ref 40–129)
ALT SERPL-CCNC: 60 U/L (ref 0–40)
ANION GAP SERPL CALCULATED.3IONS-SCNC: 12 MMOL/L (ref 7–16)
AST SERPL-CCNC: 32 U/L (ref 0–39)
BASOPHILS # BLD: 0.03 K/UL (ref 0–0.2)
BASOPHILS NFR BLD: 0 % (ref 0–2)
BILIRUB SERPL-MCNC: 0.5 MG/DL (ref 0–1.2)
BNP SERPL-MCNC: 37 PG/ML (ref 0–125)
BUN SERPL-MCNC: 19 MG/DL (ref 6–23)
CALCIUM SERPL-MCNC: 9.8 MG/DL (ref 8.6–10.2)
CHLORIDE SERPL-SCNC: 105 MMOL/L (ref 98–107)
CO2 SERPL-SCNC: 24 MMOL/L (ref 22–29)
CREAT SERPL-MCNC: 0.9 MG/DL (ref 0.7–1.2)
EKG ATRIAL RATE: 70 BPM
EKG P AXIS: 39 DEGREES
EKG P-R INTERVAL: 166 MS
EKG Q-T INTERVAL: 394 MS
EKG QRS DURATION: 102 MS
EKG QTC CALCULATION (BAZETT): 425 MS
EKG R AXIS: -12 DEGREES
EKG T AXIS: 12 DEGREES
EKG VENTRICULAR RATE: 70 BPM
EOSINOPHIL # BLD: 0.16 K/UL (ref 0.05–0.5)
EOSINOPHILS RELATIVE PERCENT: 2 % (ref 0–6)
ERYTHROCYTE [DISTWIDTH] IN BLOOD BY AUTOMATED COUNT: 12.4 % (ref 11.5–15)
GFR, ESTIMATED: 87 ML/MIN/1.73M2
GLUCOSE SERPL-MCNC: 104 MG/DL (ref 74–99)
HCT VFR BLD AUTO: 46.9 % (ref 37–54)
HGB BLD-MCNC: 17.2 G/DL (ref 12.5–16.5)
IMM GRANULOCYTES # BLD AUTO: 0.03 K/UL (ref 0–0.58)
IMM GRANULOCYTES NFR BLD: 0 % (ref 0–5)
INR PPP: 1.1
LYMPHOCYTES NFR BLD: 2 K/UL (ref 1.5–4)
LYMPHOCYTES RELATIVE PERCENT: 30 % (ref 20–42)
MAGNESIUM SERPL-MCNC: 2.2 MG/DL (ref 1.6–2.6)
MCH RBC QN AUTO: 31.3 PG (ref 26–35)
MCHC RBC AUTO-ENTMCNC: 36.7 G/DL (ref 32–34.5)
MCV RBC AUTO: 85.3 FL (ref 80–99.9)
MONOCYTES NFR BLD: 0.59 K/UL (ref 0.1–0.95)
MONOCYTES NFR BLD: 9 % (ref 2–12)
NEUTROPHILS NFR BLD: 58 % (ref 43–80)
NEUTS SEG NFR BLD: 3.9 K/UL (ref 1.8–7.3)
PLATELET # BLD AUTO: 199 K/UL (ref 130–450)
PMV BLD AUTO: 8.9 FL (ref 7–12)
POTASSIUM SERPL-SCNC: 4.1 MMOL/L (ref 3.5–5)
PROT SERPL-MCNC: 7.1 G/DL (ref 6.4–8.3)
PROTHROMBIN TIME: 11.4 SEC (ref 9.3–12.4)
RBC # BLD AUTO: 5.5 M/UL (ref 3.8–5.8)
SODIUM SERPL-SCNC: 141 MMOL/L (ref 132–146)
TROPONIN I SERPL HS-MCNC: 8 NG/L (ref 0–22)
TROPONIN I SERPL HS-MCNC: 9 NG/L (ref 0–22)
WBC OTHER # BLD: 6.7 K/UL (ref 4.5–11.5)

## 2025-05-03 PROCEDURE — 80053 COMPREHEN METABOLIC PANEL: CPT

## 2025-05-03 PROCEDURE — 83735 ASSAY OF MAGNESIUM: CPT

## 2025-05-03 PROCEDURE — 85025 COMPLETE CBC W/AUTO DIFF WBC: CPT

## 2025-05-03 PROCEDURE — 84484 ASSAY OF TROPONIN QUANT: CPT

## 2025-05-03 PROCEDURE — 93005 ELECTROCARDIOGRAM TRACING: CPT | Performed by: STUDENT IN AN ORGANIZED HEALTH CARE EDUCATION/TRAINING PROGRAM

## 2025-05-03 PROCEDURE — 83880 ASSAY OF NATRIURETIC PEPTIDE: CPT

## 2025-05-03 PROCEDURE — 96374 THER/PROPH/DIAG INJ IV PUSH: CPT

## 2025-05-03 PROCEDURE — 71045 X-RAY EXAM CHEST 1 VIEW: CPT

## 2025-05-03 PROCEDURE — 6360000002 HC RX W HCPCS: Performed by: STUDENT IN AN ORGANIZED HEALTH CARE EDUCATION/TRAINING PROGRAM

## 2025-05-03 PROCEDURE — 73030 X-RAY EXAM OF SHOULDER: CPT

## 2025-05-03 PROCEDURE — 85610 PROTHROMBIN TIME: CPT

## 2025-05-03 PROCEDURE — 99285 EMERGENCY DEPT VISIT HI MDM: CPT

## 2025-05-03 PROCEDURE — 6370000000 HC RX 637 (ALT 250 FOR IP): Performed by: STUDENT IN AN ORGANIZED HEALTH CARE EDUCATION/TRAINING PROGRAM

## 2025-05-03 RX ORDER — CYCLOBENZAPRINE HCL 10 MG
10 TABLET ORAL EVERY 8 HOURS PRN
Qty: 12 TABLET | Refills: 0 | Status: SHIPPED | OUTPATIENT
Start: 2025-05-03 | End: 2025-05-03

## 2025-05-03 RX ORDER — ASPIRIN 81 MG/1
162 TABLET, CHEWABLE ORAL ONCE
Status: DISCONTINUED | OUTPATIENT
Start: 2025-05-03 | End: 2025-05-03

## 2025-05-03 RX ORDER — METHYLPREDNISOLONE 4 MG/1
TABLET ORAL
Qty: 1 KIT | Refills: 0 | Status: SHIPPED | OUTPATIENT
Start: 2025-05-03

## 2025-05-03 RX ORDER — KETOROLAC TROMETHAMINE 30 MG/ML
15 INJECTION, SOLUTION INTRAMUSCULAR; INTRAVENOUS ONCE
Status: COMPLETED | OUTPATIENT
Start: 2025-05-03 | End: 2025-05-03

## 2025-05-03 RX ORDER — DEXAMETHASONE SODIUM PHOSPHATE 10 MG/ML
10 INJECTION, SOLUTION INTRA-ARTICULAR; INTRALESIONAL; INTRAMUSCULAR; INTRAVENOUS; SOFT TISSUE ONCE
Status: COMPLETED | OUTPATIENT
Start: 2025-05-03 | End: 2025-05-03

## 2025-05-03 RX ORDER — ASPIRIN 81 MG/1
324 TABLET, CHEWABLE ORAL ONCE
Status: DISCONTINUED | OUTPATIENT
Start: 2025-05-03 | End: 2025-05-03

## 2025-05-03 RX ADMIN — KETOROLAC TROMETHAMINE 15 MG: 30 INJECTION, SOLUTION INTRAMUSCULAR at 16:03

## 2025-05-03 RX ADMIN — DEXAMETHASONE SODIUM PHOSPHATE 10 MG: 10 INJECTION INTRAMUSCULAR; INTRAVENOUS at 16:07

## 2025-05-03 ASSESSMENT — PAIN - FUNCTIONAL ASSESSMENT
PAIN_FUNCTIONAL_ASSESSMENT: NONE - DENIES PAIN
PAIN_FUNCTIONAL_ASSESSMENT: NONE - DENIES PAIN

## 2025-05-03 ASSESSMENT — LIFESTYLE VARIABLES: HOW OFTEN DO YOU HAVE A DRINK CONTAINING ALCOHOL: NEVER

## 2025-05-03 ASSESSMENT — PAIN SCALES - GENERAL: PAINLEVEL_OUTOF10: 0

## 2025-05-03 NOTE — ED PROVIDER NOTES
[facility] EMERGENCY DEPARTMENT  EMERGENCY DEPARTMENT ENCOUNTER        Pt Name: Cesar Boyd  MRN: [id number]  Birthdate 1955  Date of evaluation: 5/3/2025  Provider: Lola Kelly DO  PCP: Thad Chamberlain Jr., DO  Note Started: 5:37 PM EDT 5/3/25    CHIEF COMPLAINT       Chief Complaint   Patient presents with    Chest Pain     Having \"shocks\" in chest, strain of left shoulder. Has been working out and golfing recently       HISTORY OF PRESENT ILLNESS: 1 or more Elements     Limitations to history : None    Cesar Boyd is a 70 y.o. male presents emergency department for evaluation of pain to his left shoulder.  States that he has been exercising a lot recently, golfing, etc., and probably about a month ago he feels like he injured his shoulder.  States it has been \"crackling\" a lot lately, especially with range of motion.  Also states that he has been doing bench pressing, a lot of weight lifting, etc.  Yesterday 2 different times he felt the pain in his left shoulder shoot towards his left chest region.  It happened again today so he came in to be evaluated.  It would only last for about 1 second each time.  He denies any substernal chest pain, no chest pressure or shortness of breath.  He denies any numbness or weakness anywhere.  Denies any history of CAD or ACS.  Does not take any daily medications.  He states that he has been told his cholesterol is borderline but it is diet controlled, he is not on any cholesterol-lowering medications.  He does not smoke cigarettes.        Nursing Notes were all reviewed and agreed with or any disagreements were addressed in the HPI.      REVIEW OF EXTERNAL NOTE :       Reviewed documentation from emergency department encounter on 5/26/2024 for evaluation of acute cough.    Chart Review/External Note Review    Last Echo reviewed by Me:  No results found for: \"LVEF\", \"LVEFMODE\"        Controlled Substance Monitoring:    Acute and  bedside re-evaluations, IV medications, cardiac monitoring, continuous pulse oximetry, and complex medical decision making and emergency management    This patient has been closely monitored during their ED course.    Counseling:   The emergency provider has spoken with the patient and discussed today’s results, in addition to providing specific details for the plan of care and counseling regarding the diagnosis and prognosis.  Questions are answered at this time and they are agreeable with the plan.    CONSULTS:   None       Please see ED course for any additional MDM documentation.       CRITICAL CARE TIME (.cct)     0 minutes            I am the Primary Clinician of Record.    FINAL IMPRESSION      1. Left shoulder pain, unspecified chronicity    2. Chest pain, unspecified type          DISPOSITION/PLAN     DISPOSITION Decision To Discharge 05/03/2025 05:36:17 PM    Disposition: Discharge to home  Patient condition is stable      PATIENT REFERRED TO:  Thad Chamberlain Jr., [address] [address]  [phone]    Call in 2 days  For follow-up    Quentin Rivera MD  [address] [facility] [address]  [phone]    Call in 2 days  For follow-up    [facility] Emergency Department  [address] [place] [address]  [phone]  Go to   As needed, If symptoms worsen      DISCHARGE MEDICATIONS:  Discharge Medication List as of 5/3/2025  5:51 PM        START taking these medications    Details   methylPREDNISolone (MEDROL, ARAVIND,) 4 MG tablet Take by mouth as instructed on kit., Disp-1 kit, R-0Normal             DISCONTINUED MEDICATIONS:  Discharge Medication List as of 5/3/2025  5:51 PM                      Lola Kelly D.O.     Emergency Medicine      5/4/2025 8:30 PM      NOTE: This report was transcribed using voice recognition software. Every effort was made to ensure accuracy; however, inadvertent computerized transcription errors may be present

## 2025-05-03 NOTE — DISCHARGE INSTRUCTIONS
Please return to the ER for any new or worsening symptoms  If prescribed, please be sure to  your prescriptions from the pharmacy  Please follow-up with Primary care provider as instructed    XR SHOULDER LEFT (MIN 2 VIEWS)   Final Result   AC joint degenerative changes.      No acute abnormality.         XR CHEST PORTABLE   Final Result   No acute process.

## 2025-05-03 NOTE — DISCHARGE INSTR - COC
Continuity of Care Form    Patient Name: Cesar Boyd   :  1955  MRN:  [id number]    Admit date:  5/3/2025  Discharge date:  ***    Code Status Order: No Order   Advance Directives:     Admitting Physician:  No admitting provider for patient encounter.  PCP: Thad Chamberlain Jr., DO    Discharging Nurse: ***  Discharging Hospital Unit/Room#:   Discharging Unit Phone Number: ***    Emergency Contact:   Extended Emergency Contact Information  Primary Emergency Contact: Lennie Boyd  Address: [address] [id number]  Home Phone: [phone]  Relation: Spouse  Secondary Emergency Contact: Stephie Boyd  Address: [address] [place] [address] [id number] [place] [address]  Home Phone: [phone]  Relation: Parent    Past Surgical History:  Past Surgical History:   Procedure Laterality Date    ACHILLES TENDON SURGERY      JOINT REPLACEMENT      KNEE SURGERY         Immunization History:   Immunization History   Administered Date(s) Administered    COVID-19, PFIZER PURPLE top, DILUTE for use, (age 12 y+), 30mcg/0.3mL 2021, 2021, 12/10/2021    TD 5LF, TENIVAC, (age 7y+), IM, 0.5mL 2023       Active Problems:  There is no problem list on file for this patient.      Isolation/Infection:   Isolation            No Isolation          Patient Infection Status    No active infections.   Resolved       Infection Onset Added Last Indicated Last Indicated By Resolved Resolved By    COVID-19 20 COVID-19 Ambulatory 20 Infection                          Nurse Assessment:  Last Vital Signs: BP (!) 169/88   Pulse 76   Temp 97.8 °F (36.6 °C) (Oral)   Resp 16   SpO2 98%     Last documented pain score (0-10 scale): Pain Level: 0  Last Weight:   Wt Readings from Last 1 Encounters:   24 83.9 kg (185 lb)     Mental Status:  {IP PT MENTAL STATUS:}    IV Access:  { IVETT IV ACCESS:[id number]}    Nursing

## 2025-05-04 ASSESSMENT — HEART SCORE
ECG: NORMAL
ECG: NORMAL

## 2025-05-05 LAB
EKG ATRIAL RATE: 70 BPM
EKG P AXIS: 39 DEGREES
EKG P-R INTERVAL: 166 MS
EKG Q-T INTERVAL: 394 MS
EKG QRS DURATION: 102 MS
EKG QTC CALCULATION (BAZETT): 425 MS
EKG R AXIS: -12 DEGREES
EKG T AXIS: 12 DEGREES
EKG VENTRICULAR RATE: 70 BPM

## 2025-05-05 PROCEDURE — 93010 ELECTROCARDIOGRAM REPORT: CPT | Performed by: INTERNAL MEDICINE

## 2025-07-21 ENCOUNTER — HOSPITAL ENCOUNTER (EMERGENCY)
Age: 70
Discharge: ANOTHER HOSPITAL | End: 2025-07-22
Attending: STUDENT IN AN ORGANIZED HEALTH CARE EDUCATION/TRAINING PROGRAM
Payer: MEDICARE

## 2025-07-21 ENCOUNTER — APPOINTMENT (OUTPATIENT)
Dept: GENERAL RADIOLOGY | Age: 70
End: 2025-07-21
Payer: MEDICARE

## 2025-07-21 DIAGNOSIS — R03.0 ELEVATED BLOOD PRESSURE READING WITHOUT DIAGNOSIS OF HYPERTENSION: ICD-10-CM

## 2025-07-21 DIAGNOSIS — R00.2 PALPITATIONS: ICD-10-CM

## 2025-07-21 DIAGNOSIS — R07.9 CHEST PAIN, UNSPECIFIED TYPE: Primary | ICD-10-CM

## 2025-07-21 LAB
ANION GAP SERPL CALCULATED.3IONS-SCNC: 12 MMOL/L (ref 7–16)
BASOPHILS # BLD: 0.02 K/UL (ref 0–0.2)
BASOPHILS NFR BLD: 0 % (ref 0–2)
BNP SERPL-MCNC: 38 PG/ML (ref 0–125)
BUN SERPL-MCNC: 16 MG/DL (ref 8–23)
CALCIUM SERPL-MCNC: 10.4 MG/DL (ref 8.8–10.2)
CHLORIDE SERPL-SCNC: 102 MMOL/L (ref 98–107)
CK SERPL-CCNC: 120 U/L (ref 0–190)
CO2 SERPL-SCNC: 26 MMOL/L (ref 22–29)
CREAT SERPL-MCNC: 0.9 MG/DL (ref 0.7–1.2)
D-DIMER QUANTITATIVE: <200 NG/ML DDU (ref 0–230)
EOSINOPHIL # BLD: 0.12 K/UL (ref 0.05–0.5)
EOSINOPHILS RELATIVE PERCENT: 1 % (ref 0–6)
ERYTHROCYTE [DISTWIDTH] IN BLOOD BY AUTOMATED COUNT: 12.4 % (ref 11.5–15)
GFR, ESTIMATED: >90 ML/MIN/1.73M2
GLUCOSE SERPL-MCNC: 96 MG/DL (ref 74–99)
HCT VFR BLD AUTO: 51 % (ref 37–54)
HGB BLD-MCNC: 18.1 G/DL (ref 12.5–16.5)
IMM GRANULOCYTES # BLD AUTO: 0.05 K/UL (ref 0–0.58)
IMM GRANULOCYTES NFR BLD: 1 % (ref 0–5)
INR PPP: 1
LYMPHOCYTES NFR BLD: 2.51 K/UL (ref 1.5–4)
LYMPHOCYTES RELATIVE PERCENT: 29 % (ref 20–42)
MAGNESIUM SERPL-MCNC: 2.5 MG/DL (ref 1.6–2.4)
MCH RBC QN AUTO: 30.7 PG (ref 26–35)
MCHC RBC AUTO-ENTMCNC: 35.5 G/DL (ref 32–34.5)
MCV RBC AUTO: 86.4 FL (ref 80–99.9)
MONOCYTES NFR BLD: 0.64 K/UL (ref 0.1–0.95)
MONOCYTES NFR BLD: 7 % (ref 2–12)
NEUTROPHILS NFR BLD: 62 % (ref 43–80)
NEUTS SEG NFR BLD: 5.36 K/UL (ref 1.8–7.3)
PLATELET # BLD AUTO: 215 K/UL (ref 130–450)
PMV BLD AUTO: 8.6 FL (ref 7–12)
POTASSIUM SERPL-SCNC: 4.1 MMOL/L (ref 3.5–5.1)
PROTHROMBIN TIME: 11.3 SEC (ref 9.3–12.4)
RBC # BLD AUTO: 5.9 M/UL (ref 3.8–5.8)
SODIUM SERPL-SCNC: 140 MMOL/L (ref 136–145)
TROPONIN I SERPL HS-MCNC: 8 NG/L (ref 0–22)
TROPONIN I SERPL HS-MCNC: 9 NG/L (ref 0–22)
WBC OTHER # BLD: 8.7 K/UL (ref 4.5–11.5)

## 2025-07-21 PROCEDURE — 85610 PROTHROMBIN TIME: CPT

## 2025-07-21 PROCEDURE — 82550 ASSAY OF CK (CPK): CPT

## 2025-07-21 PROCEDURE — 83735 ASSAY OF MAGNESIUM: CPT

## 2025-07-21 PROCEDURE — 85025 COMPLETE CBC W/AUTO DIFF WBC: CPT

## 2025-07-21 PROCEDURE — 85379 FIBRIN DEGRADATION QUANT: CPT

## 2025-07-21 PROCEDURE — 6370000000 HC RX 637 (ALT 250 FOR IP): Performed by: STUDENT IN AN ORGANIZED HEALTH CARE EDUCATION/TRAINING PROGRAM

## 2025-07-21 PROCEDURE — 99285 EMERGENCY DEPT VISIT HI MDM: CPT

## 2025-07-21 PROCEDURE — 84484 ASSAY OF TROPONIN QUANT: CPT

## 2025-07-21 PROCEDURE — 83880 ASSAY OF NATRIURETIC PEPTIDE: CPT

## 2025-07-21 PROCEDURE — 71046 X-RAY EXAM CHEST 2 VIEWS: CPT

## 2025-07-21 PROCEDURE — 93005 ELECTROCARDIOGRAM TRACING: CPT | Performed by: NURSE PRACTITIONER

## 2025-07-21 PROCEDURE — 80048 BASIC METABOLIC PNL TOTAL CA: CPT

## 2025-07-21 RX ORDER — ASPIRIN 81 MG/1
162 TABLET, CHEWABLE ORAL ONCE
Status: COMPLETED | OUTPATIENT
Start: 2025-07-21 | End: 2025-07-21

## 2025-07-21 RX ADMIN — ASPIRIN 162 MG: 81 TABLET, CHEWABLE ORAL at 21:23

## 2025-07-21 ASSESSMENT — ENCOUNTER SYMPTOMS
SHORTNESS OF BREATH: 0
NAUSEA: 0
DIARRHEA: 0
CONSTIPATION: 0
VOMITING: 0
COUGH: 0

## 2025-07-21 ASSESSMENT — PAIN - FUNCTIONAL ASSESSMENT: PAIN_FUNCTIONAL_ASSESSMENT: NONE - DENIES PAIN

## 2025-07-21 NOTE — ED NOTES
Department of Emergency Medicine  FIRST PROVIDER TRIAGE NOTE             Independent MLP           7/21/25  6:02 PM EDT    Date of Encounter: 7/21/25   MRN: [id number]      HPI: Cesar Boyd is a 70 y.o. male who presents to the ED for Palpitations (Intermittent heart palpitations starting last night. )       Patient states for the last 3 months has been having intermittent palpitations.  He states he got worse last night with indigestion/heartburn.  He states he feels spasms.  He does go to the gym daily.  Denies any radiation of pain.    Vitals:    07/21/25 1704   BP: (!) 142/92   Pulse: 75   Resp: 20   Temp: 98.1 °F (36.7 °C)   SpO2: 96%         ROS: Negative for abd pain, back pain, or dizziness.    PE: Gen Appearance/Constitutional: alert  Musculoskeletal: moves all extremities x 4     Initial Plan of Care:  Plan to order/Initiate the following while awaiting opening in ED: labs, EKG, and imaging studies.   Instructed patient and/or family member with patient if any worsening condition to notify staff.    Provider-Patient relationship only established for Provider In Triage (PIT) secondary to high volume, low staffing, and/or boarding- patient to await bed availability..  Full assessment, HPI and examination not performed.  Discussed with patient that the provider in triage evaluation is not a comprehensive medical screening exam and this is not yet possible at the end of the provider in triage encounter, to state whether or not an emergency medical condition exist  This ends my PIT-Patient relationship.    Electronically signed by JONATHAN Thomas CNP   DD: 7/21/25       Domingo Sanchez APRN - CNP  07/21/25 9516

## 2025-07-22 ENCOUNTER — APPOINTMENT (OUTPATIENT)
Dept: NUCLEAR MEDICINE | Age: 70
End: 2025-07-22
Attending: INTERNAL MEDICINE
Payer: MEDICARE

## 2025-07-22 ENCOUNTER — HOSPITAL ENCOUNTER (OUTPATIENT)
Age: 70
Setting detail: OBSERVATION
Discharge: HOME | End: 2025-07-22
Attending: INTERNAL MEDICINE | Admitting: INTERNAL MEDICINE
Payer: MEDICARE

## 2025-07-22 ENCOUNTER — APPOINTMENT (OUTPATIENT)
Age: 70
End: 2025-07-22
Attending: INTERNAL MEDICINE
Payer: MEDICARE

## 2025-07-22 VITALS
HEIGHT: 66 IN | TEMPERATURE: 97.7 F | RESPIRATION RATE: 16 BRPM | OXYGEN SATURATION: 96 % | SYSTOLIC BLOOD PRESSURE: 139 MMHG | HEART RATE: 69 BPM | WEIGHT: 194 LBS | DIASTOLIC BLOOD PRESSURE: 78 MMHG | BODY MASS INDEX: 31.18 KG/M2

## 2025-07-22 VITALS
RESPIRATION RATE: 18 BRPM | DIASTOLIC BLOOD PRESSURE: 75 MMHG | WEIGHT: 185 LBS | BODY MASS INDEX: 29.86 KG/M2 | TEMPERATURE: 97.8 F | OXYGEN SATURATION: 95 % | SYSTOLIC BLOOD PRESSURE: 133 MMHG | HEART RATE: 74 BPM

## 2025-07-22 DIAGNOSIS — R07.9 CHEST PAIN, UNSPECIFIED TYPE: ICD-10-CM

## 2025-07-22 PROBLEM — E78.49 OTHER HYPERLIPIDEMIA: Status: ACTIVE | Noted: 2025-07-22

## 2025-07-22 PROBLEM — R00.2 HEART PALPITATIONS: Status: ACTIVE | Noted: 2025-07-22

## 2025-07-22 LAB
ECHO BSA: 2.02 M2
NUC STRESS EJECTION FRACTION: 70 %
STRESS ANGINA INDEX: 0
STRESS BASELINE DIAS BP: 80 MMHG
STRESS BASELINE HR: 84 BPM
STRESS BASELINE SYS BP: 127 MMHG
STRESS ESTIMATED WORKLOAD: 10 METS
STRESS EXERCISE DUR MIN: 8 MIN
STRESS PEAK DIAS BP: 93 MMHG
STRESS PEAK SYS BP: 184 MMHG
STRESS PERCENT HR ACHIEVED: 95 %
STRESS POST PEAK HR: 141 BPM
STRESS RATE PRESSURE PRODUCT: NORMAL BPM*MMHG
STRESS TARGET HR: 149 BPM

## 2025-07-22 PROCEDURE — G0378 HOSPITAL OBSERVATION PER HR: HCPCS

## 2025-07-22 PROCEDURE — A9500 TC99M SESTAMIBI: HCPCS | Performed by: RADIOLOGY

## 2025-07-22 PROCEDURE — 93016 CV STRESS TEST SUPVJ ONLY: CPT | Performed by: INTERNAL MEDICINE

## 2025-07-22 PROCEDURE — 78452 HT MUSCLE IMAGE SPECT MULT: CPT

## 2025-07-22 PROCEDURE — 78452 HT MUSCLE IMAGE SPECT MULT: CPT | Performed by: INTERNAL MEDICINE

## 2025-07-22 PROCEDURE — 93017 CV STRESS TEST TRACING ONLY: CPT

## 2025-07-22 PROCEDURE — 93018 CV STRESS TEST I&R ONLY: CPT | Performed by: INTERNAL MEDICINE

## 2025-07-22 PROCEDURE — G0379 DIRECT REFER HOSPITAL OBSERV: HCPCS

## 2025-07-22 PROCEDURE — 3430000000 HC RX DIAGNOSTIC RADIOPHARMACEUTICAL: Performed by: RADIOLOGY

## 2025-07-22 RX ORDER — MAGNESIUM SULFATE IN WATER 40 MG/ML
2000 INJECTION, SOLUTION INTRAVENOUS PRN
Status: CANCELLED | OUTPATIENT
Start: 2025-07-22

## 2025-07-22 RX ORDER — MAGNESIUM SULFATE IN WATER 40 MG/ML
2000 INJECTION, SOLUTION INTRAVENOUS PRN
Status: DISCONTINUED | OUTPATIENT
Start: 2025-07-22 | End: 2025-07-22 | Stop reason: HOSPADM

## 2025-07-22 RX ORDER — POTASSIUM CHLORIDE 1500 MG/1
40 TABLET, EXTENDED RELEASE ORAL PRN
Status: CANCELLED | OUTPATIENT
Start: 2025-07-22

## 2025-07-22 RX ORDER — ATORVASTATIN CALCIUM 40 MG/1
40 TABLET, FILM COATED ORAL NIGHTLY
Status: CANCELLED | OUTPATIENT
Start: 2025-07-22

## 2025-07-22 RX ORDER — SODIUM CHLORIDE 0.9 % (FLUSH) 0.9 %
10 SYRINGE (ML) INJECTION PRN
Status: CANCELLED | OUTPATIENT
Start: 2025-07-22

## 2025-07-22 RX ORDER — POTASSIUM CHLORIDE 7.45 MG/ML
10 INJECTION INTRAVENOUS PRN
Status: CANCELLED | OUTPATIENT
Start: 2025-07-22

## 2025-07-22 RX ORDER — ACETAMINOPHEN 650 MG/1
650 SUPPOSITORY RECTAL EVERY 6 HOURS PRN
Status: DISCONTINUED | OUTPATIENT
Start: 2025-07-22 | End: 2025-07-22 | Stop reason: HOSPADM

## 2025-07-22 RX ORDER — ASPIRIN 81 MG/1
81 TABLET, CHEWABLE ORAL DAILY
Status: DISCONTINUED | OUTPATIENT
Start: 2025-07-23 | End: 2025-07-22 | Stop reason: HOSPADM

## 2025-07-22 RX ORDER — POTASSIUM CHLORIDE 1500 MG/1
40 TABLET, EXTENDED RELEASE ORAL PRN
Status: DISCONTINUED | OUTPATIENT
Start: 2025-07-22 | End: 2025-07-22 | Stop reason: HOSPADM

## 2025-07-22 RX ORDER — ONDANSETRON 4 MG/1
4 TABLET, ORALLY DISINTEGRATING ORAL EVERY 8 HOURS PRN
Status: CANCELLED | OUTPATIENT
Start: 2025-07-22

## 2025-07-22 RX ORDER — PRAVASTATIN SODIUM 20 MG
20 TABLET ORAL NIGHTLY
Qty: 30 TABLET | Refills: 2 | Status: SHIPPED | OUTPATIENT
Start: 2025-07-22

## 2025-07-22 RX ORDER — TETRAKIS(2-METHOXYISOBUTYLISOCYANIDE)COPPER(I) TETRAFLUOROBORATE 1 MG/ML
30 INJECTION, POWDER, LYOPHILIZED, FOR SOLUTION INTRAVENOUS
Status: COMPLETED | OUTPATIENT
Start: 2025-07-22 | End: 2025-07-22

## 2025-07-22 RX ORDER — SODIUM CHLORIDE 9 MG/ML
INJECTION, SOLUTION INTRAVENOUS PRN
Status: CANCELLED | OUTPATIENT
Start: 2025-07-22

## 2025-07-22 RX ORDER — SODIUM CHLORIDE 0.9 % (FLUSH) 0.9 %
10 SYRINGE (ML) INJECTION PRN
Status: DISCONTINUED | OUTPATIENT
Start: 2025-07-22 | End: 2025-07-22 | Stop reason: HOSPADM

## 2025-07-22 RX ORDER — ACETAMINOPHEN 325 MG/1
650 TABLET ORAL EVERY 6 HOURS PRN
Status: DISCONTINUED | OUTPATIENT
Start: 2025-07-22 | End: 2025-07-22 | Stop reason: HOSPADM

## 2025-07-22 RX ORDER — POTASSIUM CHLORIDE 7.45 MG/ML
10 INJECTION INTRAVENOUS PRN
Status: DISCONTINUED | OUTPATIENT
Start: 2025-07-22 | End: 2025-07-22 | Stop reason: HOSPADM

## 2025-07-22 RX ORDER — ASPIRIN 81 MG/1
81 TABLET, CHEWABLE ORAL DAILY
Status: CANCELLED | OUTPATIENT
Start: 2025-07-23

## 2025-07-22 RX ORDER — ONDANSETRON 4 MG/1
4 TABLET, ORALLY DISINTEGRATING ORAL EVERY 8 HOURS PRN
Status: DISCONTINUED | OUTPATIENT
Start: 2025-07-22 | End: 2025-07-22 | Stop reason: HOSPADM

## 2025-07-22 RX ORDER — REGADENOSON 0.08 MG/ML
0.4 INJECTION, SOLUTION INTRAVENOUS
Status: CANCELLED | OUTPATIENT
Start: 2025-07-22

## 2025-07-22 RX ORDER — ACETAMINOPHEN 325 MG/1
650 TABLET ORAL EVERY 6 HOURS PRN
Status: CANCELLED | OUTPATIENT
Start: 2025-07-22

## 2025-07-22 RX ORDER — ONDANSETRON 2 MG/ML
4 INJECTION INTRAMUSCULAR; INTRAVENOUS EVERY 6 HOURS PRN
Status: CANCELLED | OUTPATIENT
Start: 2025-07-22

## 2025-07-22 RX ORDER — CALCIUM CARBONATE 500 MG/1
1 TABLET, CHEWABLE ORAL
COMMUNITY
Start: 2025-07-22 | End: 2025-08-21

## 2025-07-22 RX ORDER — ENOXAPARIN SODIUM 100 MG/ML
40 INJECTION SUBCUTANEOUS DAILY
Status: DISCONTINUED | OUTPATIENT
Start: 2025-07-22 | End: 2025-07-22 | Stop reason: HOSPADM

## 2025-07-22 RX ORDER — REGADENOSON 0.08 MG/ML
0.4 INJECTION, SOLUTION INTRAVENOUS
Status: DISCONTINUED | OUTPATIENT
Start: 2025-07-22 | End: 2025-07-22 | Stop reason: ALTCHOICE

## 2025-07-22 RX ORDER — ENOXAPARIN SODIUM 100 MG/ML
40 INJECTION SUBCUTANEOUS DAILY
Status: CANCELLED | OUTPATIENT
Start: 2025-07-22

## 2025-07-22 RX ORDER — ONDANSETRON 2 MG/ML
4 INJECTION INTRAMUSCULAR; INTRAVENOUS EVERY 6 HOURS PRN
Status: DISCONTINUED | OUTPATIENT
Start: 2025-07-22 | End: 2025-07-22 | Stop reason: HOSPADM

## 2025-07-22 RX ORDER — SODIUM CHLORIDE 9 MG/ML
INJECTION, SOLUTION INTRAVENOUS PRN
Status: DISCONTINUED | OUTPATIENT
Start: 2025-07-22 | End: 2025-07-22 | Stop reason: HOSPADM

## 2025-07-22 RX ORDER — TETRAKIS(2-METHOXYISOBUTYLISOCYANIDE)COPPER(I) TETRAFLUOROBORATE 1 MG/ML
10 INJECTION, POWDER, LYOPHILIZED, FOR SOLUTION INTRAVENOUS
Status: COMPLETED | OUTPATIENT
Start: 2025-07-22 | End: 2025-07-22

## 2025-07-22 RX ORDER — SODIUM CHLORIDE 0.9 % (FLUSH) 0.9 %
5-40 SYRINGE (ML) INJECTION EVERY 12 HOURS SCHEDULED
Status: DISCONTINUED | OUTPATIENT
Start: 2025-07-22 | End: 2025-07-22 | Stop reason: HOSPADM

## 2025-07-22 RX ORDER — ATORVASTATIN CALCIUM 40 MG/1
40 TABLET, FILM COATED ORAL NIGHTLY
Status: DISCONTINUED | OUTPATIENT
Start: 2025-07-22 | End: 2025-07-22 | Stop reason: HOSPADM

## 2025-07-22 RX ORDER — ACETAMINOPHEN 650 MG/1
650 SUPPOSITORY RECTAL EVERY 6 HOURS PRN
Status: CANCELLED | OUTPATIENT
Start: 2025-07-22

## 2025-07-22 RX ORDER — SODIUM CHLORIDE 0.9 % (FLUSH) 0.9 %
5-40 SYRINGE (ML) INJECTION EVERY 12 HOURS SCHEDULED
Status: CANCELLED | OUTPATIENT
Start: 2025-07-22

## 2025-07-22 RX ADMIN — Medication 10 MILLICURIE: at 08:08

## 2025-07-22 RX ADMIN — Medication 30 MILLICURIE: at 08:08

## 2025-07-22 ASSESSMENT — ENCOUNTER SYMPTOMS
COUGH: 0
ABDOMINAL PAIN: 0
SHORTNESS OF BREATH: 0
DIARRHEA: 0
VOMITING: 0
NAUSEA: 0
EYES NEGATIVE: 1

## 2025-07-22 ASSESSMENT — HEART SCORE: ECG: NON-SPECIFC REPOLARIZATION DISTURBANCE/LBTB/PM

## 2025-07-22 NOTE — ED PROVIDER NOTES
the Primary Clinician of Record.    FINAL IMPRESSION      1. Chest pain, unspecified type    2. Palpitations    3. Elevated blood pressure reading without diagnosis of hypertension          DISPOSITION/PLAN     DISPOSITION Decision To Transfer 07/21/2025 10:44:56 PM    Disposition: Admit to telemetry at [facility]  Patient condition is stable      PATIENT REFERRED TO:  No follow-up provider specified.    DISCHARGE MEDICATIONS:  New Prescriptions    No medications on file       DISCONTINUED MEDICATIONS:  Discontinued Medications    METHYLPREDNISOLONE (MEDROL, ARAVIND,) 4 MG TABLET    Take by mouth as instructed on kit.                   Lola Kelly D.O.     Emergency Medicine      7/22/2025 12:12 AM      NOTE: This report was transcribed using voice recognition software. Every effort was made to ensure accuracy; however, inadvertent computerized transcription errors may be present           Lola Kelly DO  07/22/25 0012

## 2025-07-22 NOTE — H&P
[facility]   History and Physical      CHIEF COMPLAINT:  palpitations    Reason for Admission:  chest pain    History Obtained From:  patient, electronic medical record    HISTORY OF PRESENT ILLNESS:      The patient is a 70 y.o. male of Thad Chamberlain Jr., DO with significant past medical history of HLD who presents with palpations and heartburn.    Hx of HLD - tried crestor but had nausea and hair loss on it so stopped after 3 weeks.  Mostly diet controlled but borderline.    For 3 months, having intermittent palpitations. Last 1-2 minutes. Usually associated with over exertion, caffeine.  Been having increased episodes recently.  Goes to gym regularly.  Ate some pizza and then went golfing. Developed burning substernal chest pain, that resolved after few minutes. No change with rest or exertion.  Came to ER.     Doing well. Just had stress test - treadmill.   Did well.  Had some caffeine shortly after - developed brief palpitations again.    All labs personally reviewed   All imaging personally reviewed     Past Medical History:        Diagnosis Date    Hyperlipidemia      Past Surgical History:        Procedure Laterality Date    ACHILLES TENDON SURGERY      JOINT REPLACEMENT      KNEE SURGERY           Medications Prior to Admission:    No medications prior to admission.    Allergies:  Morphine    Social History:   TOBACCO:   reports that he has never smoked. He has never used smokeless tobacco.  ETOH:   reports current alcohol use.  MARITAL STATUS:    OCCUPATION:      Family History:       Problem Relation Age of Onset    Heart Surgery Father     Leukemia Father        REVIEW OF SYSTEMS:    Review of Systems   Constitutional:  Negative for chills, fatigue and fever.   HENT: Negative.     Eyes: Negative.    Respiratory:  Negative for cough and shortness of breath.    Cardiovascular:  Positive for chest pain and palpitations. Negative for leg swelling.   Gastrointestinal:  Negative for

## 2025-07-22 NOTE — PLAN OF CARE
Problem: Discharge Planning  Goal: Discharge to home or other facility with appropriate resources  Outcome: Progressing  Flowsheets (Taken 7/22/2025 2366)  Discharge to home or other facility with appropriate resources: Identify barriers to discharge with patient and caregiver

## 2025-07-22 NOTE — CARE COORDINATION
Transition of Care-attempted to meet with patient-off unit for Stress Test. Per nursing/medical record, patient is independent ADL's. He resides in a two story home, [address]STE with his wife Lennie, no DME in use. PCP is Dr. Chamberlain, preferred pharmacy is [facility] in [place]. OBS status for chest pain. Spoke with attending-plan to discharge later today if Stress is negative. Discharge plan is home, no anticipated needs.    Danika MOOREN, RN  [facility]

## 2025-07-22 NOTE — DISCHARGE SUMMARY
[facility]   Discharge summary   Patient ID:  Cesar Boyd  [id number]  70 y.o.  1955    Admit date: 7/22/2025    Discharge date and time: 7/22/2025    Admission Diagnoses:   Patient Active Problem List   Diagnosis    Heart palpitations    Other hyperlipidemia       Discharge Diagnoses: chest pain - likely heartburn. Palpitations - likely PAC.     Consults: none    Procedures: cardiac stress test 7/22/25 - neg.    Hospital Course: The patient is a 70 y.o. male of Thad Chamberlain Jr.,  with significant past medical history of HLD who presents with palpations and heartburn.     Hx of HLD - tried crestor but had nausea and hair loss on it so stopped after 3 weeks.  Mostly diet controlled but borderline.     For 3 months, having intermittent palpitations. Last 1-2 minutes. Usually associated with over exertion, caffeine.  Been having increased episodes recently.  Goes to gym regularly.  Ate some pizza and then went golfing. Developed burning substernal chest pain, that resolved after few minutes. No change with rest or exertion.  Came to ER.     Chest pain and palpations resolved.   EKG unremarkable. Troponin neg x2.  Had treadmill nuclear stress test - no issues. Test neg for ischemia.  Had coffee shortly after, developed brief palpitations.     Likely heartburn with palpitations from PACs.  On telemetry - NSR, no PACs noted, but history fits.  Patient with borderline HLD - patient willing to try pravastatin due to less side effects.  Stable for discharge today. F/u with PCP 1 week.  Tums PRN for heartburn.      Recent Labs     07/21/25  1815   WBC 8.7   HGB 18.1*   HCT 51.0          Recent Labs     07/21/25  1815      K 4.1      CO2 26   BUN 16   CREATININE 0.9   CALCIUM 10.4*       Nuclear stress test with myocardial perfusion  Result Date: 7/22/2025    Stress Test: A Eamon protocol stress test was performed. Overall, the patient's exercise capacity was average for

## 2025-07-22 NOTE — PROGRESS NOTES
Patient room safe not working properly [employer] called and to the patients room and the patients money sent to the hospital safe and receipt given to patient and in the patients soft chart

## 2025-07-22 NOTE — PROGRESS NOTES
4 Eyes Skin Assessment     NAME:  Cesar Boyd  YOB: 1955  MEDICAL RECORD NUMBER:  [id number]    The patient is being assessed for  Admission    I agree that at least one RN has performed a thorough Head to Toe Skin Assessment on the patient. ALL assessment sites listed below have been assessed.      Areas assessed by both nurses:    Head, Face, Ears, Shoulders, Back, Chest, Arms, Elbows, Hands, Sacrum. Buttock, Coccyx, Ischium, Legs. Feet and Heels, and Under Medical Devices         Does the Patient have a Wound? No noted wound(s)       Estiven Prevention initiated by RN: No  Wound Care Orders initiated by RN: No    For hospital-acquired stage 1 & 2 and ALL Stage 3,4, Unstageable, DTI, NWPT, and Complex wounds: place order “IP Wound Care/Ostomy Nurse Eval and Treat” by RN under : No    New Ostomies, if present place, Ostomy referral order under : No     Nurse 1 eSignature: Electronically signed by Cresencio Perez on 7/22/25 at 5:25 AM EDT    **SHARE this note so that the co-signing nurse can place an eSignature**    Nurse 2 eSignature: Electronically signed by Sri Burns RN on 7/22/25 at 5:29 AM EDT

## 2025-07-24 LAB
EKG ATRIAL RATE: 72 BPM
EKG P AXIS: 36 DEGREES
EKG P-R INTERVAL: 162 MS
EKG Q-T INTERVAL: 392 MS
EKG QRS DURATION: 100 MS
EKG QTC CALCULATION (BAZETT): 429 MS
EKG R AXIS: -48 DEGREES
EKG T AXIS: 30 DEGREES
EKG VENTRICULAR RATE: 72 BPM

## 2025-07-24 PROCEDURE — 93010 ELECTROCARDIOGRAM REPORT: CPT | Performed by: INTERNAL MEDICINE
